# Patient Record
Sex: MALE | URBAN - METROPOLITAN AREA
[De-identification: names, ages, dates, MRNs, and addresses within clinical notes are randomized per-mention and may not be internally consistent; named-entity substitution may affect disease eponyms.]

---

## 2017-11-22 ENCOUNTER — IMPORTED ENCOUNTER (OUTPATIENT)
Dept: URBAN - METROPOLITAN AREA CLINIC 9 | Facility: CLINIC | Age: 62
End: 2017-11-22

## 2017-11-29 ENCOUNTER — IMPORTED ENCOUNTER (OUTPATIENT)
Dept: URBAN - METROPOLITAN AREA CLINIC 9 | Facility: CLINIC | Age: 62
End: 2017-11-29

## 2017-12-20 ENCOUNTER — IMPORTED ENCOUNTER (OUTPATIENT)
Dept: URBAN - METROPOLITAN AREA CLINIC 9 | Facility: CLINIC | Age: 62
End: 2017-12-20

## 2018-01-09 ENCOUNTER — IMPORTED ENCOUNTER (OUTPATIENT)
Dept: URBAN - METROPOLITAN AREA CLINIC 9 | Facility: CLINIC | Age: 63
End: 2018-01-09

## 2019-09-23 NOTE — PATIENT DISCUSSION
DERMATOCHALASIS / PTOSIS RUL AND LORI :  VISUALLY SIGNIFICANT TO PATIENT. SCHEDULE WITH OCULOPLASTIC SPECIALIST IF PATIENT DESIRES.

## 2019-09-23 NOTE — PATIENT DISCUSSION
ARMEN OU:  PRESCRIBE ARTIFICIAL TEARS BID - QID. DECREASE OUTDOOR EXPOSURE AND USE UV PROTECTION/ SUNGLASSES WITH OUTDOOR ACTIVITIES. RETURN FOR FOLLOW UP AS SCHEDULED.

## 2019-09-23 NOTE — PATIENT DISCUSSION
VITREOMACULAR TRACTION  OD  : NO HOLES OR NO TEARS 360' WITH INDIRECT EXAM, OU. F&amp;F PRECAUTIONS REVIEWED WITH THE PATIENT. RETURN  AS SCHEDULED. REFER TO DR. Ezekiel Blackwood.

## 2019-10-30 NOTE — PATIENT DISCUSSION
Pre-Diabetes without Retinopathy Counseling: I have discussed with the patient the importance of controlling blood glucose, blood pressure and lipid levels to minimize the risk of developing retinal disease from diabetes. Explained the importance of annual dilated eye exams because effective treatment for diabetic retinopathy depends on timely intervention. The patient was instructed to call or return sooner if they noticed blurred or distorted vision, fluctuating vision, pain or redness around one or both eyes. Return for follow-up as scheduled.

## 2020-10-01 NOTE — PATIENT DISCUSSION
CATARACT, OU - VISUALLY SIGNIFICANT. SCHEDULE PHACO WITH IOL OS SET FOR DISTANCE FIRST THEN OD SET FOR NEAR IF VISUAL SYMPTOMS PERSIST. PATIENT TO CALL BACK IF THEY DECIDE TO PROCEED WITH SURGERY WITHIN 2 MONTHS DUE TO CLEARANCE TIME FRAME FROM DR. Lily Robles. OTHERWISE, FOLLOW AS SCHEDULED. SPOKE IN GREAT DETAIL ABOUT CORRECTING ASTIGMATISM. PT WOULD LIKE TO ONLY DO WHAT INSURANCE COVERS. SPOKE IN GREAT DETAIL ABOUT MONOVISION.

## 2020-10-01 NOTE — PATIENT DISCUSSION
VITREOMACULAR TRACTION  OD: NO HOLES OR NO TEARS 360' WITH INDIRECT EXAM, OU. F&amp;F PRECAUTIONS REVIEWED WITH THE PATIENT. RETURN  AS SCHEDULED.

## 2020-10-01 NOTE — PATIENT DISCUSSION
OSMonovision Patient requests 90 day supply of her birth control be sent to [facility].      [facility] - [place], [place] - [address] [place] [id number]  Phone: [phone] Fax: [phone]

## 2020-10-20 NOTE — PATIENT DISCUSSION
New Prescription: prednisoln vu-rnrnjsec-nltkgrw (prednisoln gz-hfiqmsxa-mnjaadz): drops: 1-0.5-0.075% 1 drop three times a day into left eye 10-

## 2020-10-20 NOTE — PATIENT DISCUSSION
CATARACT, OU - VISUALLY SIGNIFICANT. SCHEDULE PHACO WITH IOL OS SET FOR DISTANCE FIRST THEN OD SET FOR DISTANCE IF VISUAL SYMPTOMS PERSIST.

## 2020-11-04 NOTE — PATIENT DISCUSSION
Continue in left eye until first bottle runs out. Start second bottle two days prior to surgery in right eye and continue as directed.

## 2020-11-04 NOTE — PATIENT DISCUSSION
Continue: prednisoln nz-fpgowfgg-vmmmjbc (prednisoln iv-nrguevxw-ifkqfpw): drops: 1-0.5-0.075% 1 drop three times a day into both eyes 10-

## 2020-11-04 NOTE — PATIENT DISCUSSION
S/P PC IOL, OS: DOING WELL. CONTINUE DROPS AS DIRECTED. MAY USE PFATS PRN FOR ANY IRRITATION. RETURN FOR FOLLOW-UP AS SCHEDULED WITH DR. TROTTER.

## 2020-11-06 NOTE — PATIENT DISCUSSION
*Pre-Op 2nd Eye Counseling: The patient has noticed an improvement in their visual symptoms in the operative eye. The patient complains of decreased vision in the fellow eye when driving and reading. It was explained to the patient that the decision to proceed with cataract surgery in the fellow eye is entirely a separate decision from the surgical eye. All of the same risks, benefits and alternatives ere reviewed with the patient again. The patient does feel the vision in the non-operative eye is limiting their daily activities and elects to proceed with surgery in the cataract eye.

## 2020-11-06 NOTE — PATIENT DISCUSSION
General: Knee Pain or Injury: Care Instructions  Your Care Instructions    Injuries are a common cause of knee problems. Sudden (acute) injuries may be caused by a direct blow to the knee. They can also be caused by abnormal twisting, bending, or falling on the knee. Pain, bruising, or swelling may be severe, and may start within minutes of the injury. Overuse is another cause of knee pain. Other causes are climbing stairs, kneeling, and other activities that use the knee. Everyday wear and tear, especially as you get older, also can cause knee pain. Rest, along with home treatment, often relieves pain and allows your knee to heal. If you have a serious knee injury, you may need tests and treatment. Follow-up care is a key part of your treatment and safety. Be sure to make and go to all appointments, and call your doctor if you are having problems. It's also a good idea to know your test results and keep a list of the medicines you take. How can you care for yourself at home? · Be safe with medicines. Read and follow all instructions on the label. ? If the doctor gave you a prescription medicine for pain, take it as prescribed. ? If you are not taking a prescription pain medicine, ask your doctor if you can take an over-the-counter medicine. · Rest and protect your knee. Take a break from any activity that may cause pain. · Put ice or a cold pack on your knee for 10 to 20 minutes at a time. Put a thin cloth between the ice and your skin. · Prop up a sore knee on a pillow when you ice it or anytime you sit or lie down for the next 3 days. Try to keep it above the level of your heart. This will help reduce swelling. · If your knee is not swollen, you can put moist heat, a heating pad, or a warm cloth on your knee. · If your doctor recommends an elastic bandage, sleeve, or other type of support for your knee, wear it as directed.   · Follow your doctor's instructions about how much weight you can put on your leg. Use a cane, crutches, or a walker as instructed. · Follow your doctor's instructions about activity during your healing process. If you can do mild exercise, slowly increase your activity. · Reach and stay at a healthy weight. Extra weight can strain the joints, especially the knees and hips, and make the pain worse. Losing even a few pounds may help. When should you call for help? Call 911 anytime you think you may need emergency care. For example, call if:    · You have symptoms of a blood clot in your lung (called a pulmonary embolism). These may include:  ? Sudden chest pain. ? Trouble breathing. ? Coughing up blood.    Call your doctor now or seek immediate medical care if:    · You have severe or increasing pain.     · Your leg or foot turns cold or changes color.     · You cannot stand or put weight on your knee.     · Your knee looks twisted or bent out of shape.     · You cannot move your knee.     · You have signs of infection, such as:  ? Increased pain, swelling, warmth, or redness. ? Red streaks leading from the knee. ? Pus draining from a place on your knee. ? A fever.     · You have signs of a blood clot in your leg (called a deep vein thrombosis), such as:  ? Pain in your calf, back of the knee, thigh, or groin. ? Redness and swelling in your leg or groin.    Watch closely for changes in your health, and be sure to contact your doctor if:    · You have tingling, weakness, or numbness in your knee.     · You have any new symptoms, such as swelling.     · You have bruises from a knee injury that last longer than 2 weeks.     · You do not get better as expected. Where can you learn more? Go to http://lorna-galilea.info/. Enter K195 in the search box to learn more about \"Knee Pain or Injury: Care Instructions. \"  Current as of: September 23, 2018  Content Version: 12.1  © 3086-0515 [employer], [employer].  Care instructions adapted under license by [employer] (which disclaims liability or warranty for this information). If you have questions about a medical condition or this instruction, always ask your healthcare professional. [address] any warranty or liability for your use of this information.

## 2020-11-06 NOTE — PATIENT DISCUSSION
Continue: prednisoln fa-ahedaunf-slcxnkh (prednisoln gf-jgmzfexx-hooapkk): drops: 1-0.5-0.075% 1 drop three times a day into both eyes 10-

## 2020-11-18 NOTE — PATIENT DISCUSSION
New Prescription: Prolensa (bromfenac): drops: 0.07% 1 drop every morning as directed into right eye 11-

## 2020-11-18 NOTE — PATIENT DISCUSSION
S/P PC IOL, OD: GOOD POST OP RESULT. CONTINUE COMBINATION DROPS AS DIRECTED UNTIL FURTHER INSTRUCTION. ONCE COMBINATION DROP RUNS OUT, BEGIN PROLENSA QD FOR 4-5 WEEKS DUE TO VMT. RETURN FOR FOLLOW-UP IN 2 WEEKS.

## 2020-11-18 NOTE — PATIENT DISCUSSION
Post-Op Instructions: The patient was instructed in the proper use of post-operative eye drops. Continue combination drops as directed. Call back instructions, retinal detachment and endophthalmitis precautions given.

## 2020-11-18 NOTE — PATIENT DISCUSSION
Continue: prednisoln yy-ngqmvywc-edwzzik (prednisoln ko-jgxkjdpg-mnsaxjt): drops: 1-0.5-0.075% 1 drop three times a day into both eyes 10-

## 2020-11-18 NOTE — PATIENT DISCUSSION
Continue in left eye until first bottle runs out. Continue in right eye until second bottle runs out.

## 2020-12-02 NOTE — PATIENT DISCUSSION
Continue: prednisoln zz-sdahjgjt-gihakhk (prednisoln uf-kzwkcitz-bvcuioa): drops: 1-0.5-0.075% 1 drop three times a day into both eyes 10-

## 2021-01-15 NOTE — PATIENT DISCUSSION
Continue: prednisoln qi-djjcflxp-ghkpvcc (prednisoln od-xdcpmszp-gdxiryg): drops: 1-0.5-0.075% 1 drop three times a day into both eyes 10-

## 2021-01-15 NOTE — PATIENT DISCUSSION
REFRACTIVE ERROR WITH PRESBYOPIA OU: UPDATED SRX RELEASED FOR FTW - DOCTORS CHANGE. RTC FOR UPDATED CL FIT OR SOONER IF DISCOMFORT OR SI/SX PERSIST.

## 2021-01-27 NOTE — PATIENT DISCUSSION
HYPEROPIA/ASTIGMATISM/PRESBYOPIA OU: MONOVISION CL RX/TRIALS RELEASED TO PATIENT. DISCUSSED PROPER WEAR/CARE/HYGIENE OF CLS. RTC IF DISCOMFORT.

## 2021-01-27 NOTE — PATIENT DISCUSSION
Continue: prednisoln ec-oeeawlgo-hezlplw (prednisoln kc-dlwqsibu-stqsaao): drops: 1-0.5-0.075% 1 drop three times a day into both eyes 10-

## 2021-02-03 NOTE — PATIENT DISCUSSION
RETINA IS ATTACHED OU:PVD OS; NO HOLES OR TEARS SEEN ON DILATED EXAM TODAY.  RETINAL DETACHMENT SIGNS AND SYMPTOMS REVIEWED

## 2021-04-27 NOTE — PATIENT DISCUSSION
REFRACTIVE ERROR OU: UPDATED CL RX AND TRIALS ORDERED TO REFLECT OVER REFRACTION FOR IMPROVED NEAR VISION. ADVISED TO RTC IF SI/SX PERSIST OR WITH ANY DISCOMFORT. MONITOR.

## 2021-06-11 NOTE — PATIENT DISCUSSION
SUBCONJUNCTIVAL HEMORRHAGE OS: EDUCATED PATIENT ON FINDINGS AND SELF-LIMITED NATURE OF CONDITION. RECOMMENDED COLD COMPRESS AND OTC ARTIFICIAL TEARS FOR ANY DISCOMFORT. ADVISED TO RTC IF SI/SX PERSIST OR WORSEN. MONITOR.

## 2021-06-11 NOTE — PATIENT DISCUSSION
PUNCTATE KERATITIS OS: EDUCATED PATIENT ON FINDINGS. NO CL/FB IN EYE ON THOROUGH EXAMINATION WITH DOUBLE LID EVERSION. PRESCRIBE ARTIFICIAL TEARS 4-6X/DAY OS. RECOMMEND REMAINING OUT OF CL UNTIL SI/SX RESOLVE. ADVISED TO RTC IF SI/SX PERSIST OR WORSEN. MONITOR.

## 2021-10-18 ASSESSMENT — VISUAL ACUITY
OS_CC: 20/30 - SN
OD_CC: 20/40 SN
OD_SC: 20/25 -2 SN
OD_CC: 20/25 SN
OS_CC: 20/20 SN
OD_CC: 20/40 SN
OD_CC: 20/20 SN
OS_CC: 20/30 - SN
OS_SC: 20/20 - SN
OS_SC: 20/20 -2 SN
OS_CC: 20/25 SN
OD_CC: 20/25 SN
OS_CC: 20/25 SN

## 2021-10-18 ASSESSMENT — TONOMETRY
OS_IOP_MMHG: 19
OD_IOP_MMHG: 20
OD_IOP_MMHG: 18

## 2021-11-29 NOTE — PATIENT DISCUSSION
Advised patient to wear sunglasses to avoid progression. Use artificial tears as needed for comfort.
Artificial Tears BID-QID.
Continue with retinal specialist.
Discussed condition and exacerbating conditions/situations (e.g., dry/arid environments, overhead fans, air conditioners, side effect of medications).
Discussed that a PVD is a natural aging change of the vitreous where it shrinks and pulls away from the retina. For most people, a PVD is benign event with no symptoms and no vision loss. Others may notice Floaters/Flashes but over time they usually become less noticeable. In a small amount of cases the vitreous can pull too hard from the back of the eye and result in a retinal hole or tear.
Early posterior capsular opacification was noted. It was not causing any decreased vision or glare at this time. No treatment was recommended at this time.
Instructed to call immediately if any new distortion, blurring, decreased vision or eye pain.
Instructed to call immediately if any new distortion, blurring, decreased vision, or eye pain.
It was discussed with the patient the importance of good control of their blood sugar, blood pressure, cholesterol, diet, exercise and weight under the guidance of their diabetic doctor to prevent/halt diabetic retinopathy.
Monitor.
No retinal tears, holes or detachments seen on exam.
Recommended yearly dilated eye examinations.
Retinal tear and detachment warning symptoms reviewed and patient instructed to call immediately if increasing floaters, flashes, or decreasing peripheral vision.
UV protection recommended.
rolling walker

## 2021-12-27 NOTE — PATIENT DISCUSSION
*S/P PC IOL, OS : DOING WELL. CONTINUE TO TAPER DROPS AS DIRECTED. OK TO PROCEED WITH THE 2ND EYE CATARACT SURGERY AS SCHEDULED. High Dose Vitamin A Counseling: Side effects reviewed, pt to contact office should one occur.

## 2022-02-16 NOTE — PATIENT DISCUSSION
New [place] plaque OD on exam today.  She denies any visual changes, she denies any GCA symptoms including temporal headaches, scalp tenderness, jaw claudication, etc.

## 2022-07-07 RX ORDER — NEBIVOLOL 5 MG/1
TABLET ORAL
COMMUNITY

## 2022-07-07 RX ORDER — OLMESARTAN MEDOXOMIL 5 MG/1
TABLET ORAL
COMMUNITY
End: 2022-08-04

## 2022-07-07 RX ORDER — ICOSAPENT ETHYL 1000 MG/1
CAPSULE ORAL 2 TIMES DAILY
COMMUNITY
End: 2022-08-04 | Stop reason: SDUPTHER

## 2022-07-07 RX ORDER — LEVOTHYROXINE SODIUM 0.07 MG/1
TABLET ORAL
COMMUNITY

## 2022-07-07 RX ORDER — CHLORTHALIDONE 25 MG/1
TABLET ORAL
COMMUNITY
End: 2022-08-04 | Stop reason: SDUPTHER

## 2022-07-07 RX ORDER — SPIRONOLACTONE 25 MG/1
TABLET ORAL
COMMUNITY
End: 2022-08-04 | Stop reason: SDUPTHER